# Patient Record
(demographics unavailable — no encounter records)

---

## 2018-08-15 NOTE — RAD
LEFT HIP TWO VIEWS:

8/15/18

 

No fracture was appreciated. There is no dislocation or joint space narrowing. The adjacent pubic rin
g appeared intact. Very subtle fractures might be missed on this study and would only be seen by CT. 


 

IMPRESSION:  

No definite acute findings. If pain persists, then consider followup CT or MRI. 

 

POS: HOME

## 2018-08-17 NOTE — CT
CT LUMBAR SPINE NONCONTRAST:

8/17/18

 

HISTORY: 

Fall. Back pain.

 

FINDINGS:  

compression of the L2 superior end plate with loss of height by approximately 30% appears. Stable com
pared to the CT abdomen from 2/28/18.

Very mild retropulsion. No acute fracture or dislocation. Prominent degenerative changes including ce
ntral canal and foraminal stenoses. 

 

Images including the retroperitoneum show calcifications associated with each kidney and parapelvic c
ysts of the left kidney.

 

IMPRESSION:  

Partial compression L2 superior end plate appears stable. No acute osseous abnormalities are demonstr
ated. 

 

Prominent degenerative changes lumbar spine with central canal and foraminal stenosis. 

 

POS: [facility]

## 2018-08-17 NOTE — RAD
AP PELVIS ONE VIEW:

8/17/18

 

HISTORY: 

Fall. Pelvic injury. 

 

FINDINGS/IMPRESSION:  

Sacral alae and pelvic rings are intact. Osseous structures are demineralized. 

 

No displaced fractures are evident.

 

POS: [facility]

## 2018-08-18 NOTE — PDOC.PN
- Subjective


Encounter Start Date: 08/18/18


Encounter Start Time: 08:00


-: old records requested/rev





Patient seen and examined. No new complaints. No overnight events





- Objective


Resuscitation Status: 


 











Resuscitation Status           FULL:Full Resuscitation














MAR Reviewed: Yes


Vital Signs & Weight: 


 Vital Signs (12 hours)











  Temp Pulse Resp BP Pulse Ox


 


 08/18/18 07:45  97.9 F  88  16  124/77  94 L


 


 08/18/18 04:11  97.9 F  85  16  101/66  95


 


 08/18/18 01:00  98.3 F  91  20  133/85  98








 Weight











Weight                         119 lb 0.794 oz














I&O: 


 











 08/17/18 08/18/18 08/19/18





 06:59 06:59 06:59


 


Intake Total  320 


 


Balance  320 











Result Diagrams: 


 08/18/18 04:44





 08/18/18 04:44


Radiology Reviewed by me: Yes





Phys Exam





- Physical Examination


Constitutional: NAD


HEENT: PERRLA, moist MMs, sclera anicteric, oral pharynx no lesions


Neck: no JVD, supple


Respiratory: no wheezing, no rales, no rhonchi


Cardiovascular: RRR, no significant murmur, no rub


Gastrointestinal: soft, non-tender, no distention, positive bowel sounds


Musculoskeletal: no edema, pulses present


Neurological: non-focal, normal sensation


Lymphatic: no nodes


Psychiatric: normal affect, A&O x 3


Skin: no rash, normal turgor





Dx/Plan


(1) Compression fracture of L2 lumbar vertebra


Code(s): S32.020A - WEDGE COMPRESSION FRACTURE OF SECOND LUMBAR VERTEBRA, INIT 

  Status: Acute   





(2) Intractable back pain


Code(s): M54.9 - DORSALGIA, UNSPECIFIED   Status: Acute   





- Plan


cont current plan of care, plan discussed w/ family, PT/OT, 





* pain control


* may need brace


* may need rehab


* neurosurgery consulted.








Review of Systems





- Review of Systems


Eyes: negative: Pain, Vision Change, Conjunctivae Inflammation, Eyelid 

Inflammation, Redness, Other


ENT: negative: Ear Pain, Ear Discharge, Nose Pain, Nose Discharge, Nose 

Congestion, Mouth Pain, Mouth Swelling, Throat Pain, Throat Swelling, Other


Respiratory: negative: Cough, Dry, Shortness of Breath, Hemoptysis, SOB with 

Excertion, Pleuritic Pain, Sputum, Wheezing


Cardiovascular: negative: chest pain, palpitations, orthopnea, paroxysmal 

nocturnal dyspnea, edema, light headedness, other


Gastrointestinal: negative: Nausea, Vomiting, Abdominal Pain, Diarrhea, 

Constipation, Melena, Hematochezia, Other


Genitourinary: negative: Dysuria, Frequency, Incontinence, Hematuria, Retention

, Other


Musculoskeletal: Back Pain.  negative: Neck Pain, Shoulder Pain, Arm Pain, Hand 

Pain, Leg Pain, Foot Pain, Other





- Medications/Allergies


Allergies/Adverse Reactions: 


 Allergies











Allergy/AdvReac Type Severity Reaction Status Date / Time


 


No Known Drug Allergies Allergy   Verified 08/18/18 01:29











Medications: 


 Current Medications





Acetaminophen (Tylenol)  650 mg PO Q4H PRN


   PRN Reason: Headache/Fever or Pain


   Last Admin: 08/18/18 01:42 Dose:  650 mg


Hydrocodone Bitart/Acetaminophen (Norco 5/325)  2 tab PO Q4H PRN


   PRN Reason: Severe Pain (7-10)


Hydrocodone Bitart/Acetaminophen (Norco 5/325)  1 tab PO Q4H PRN


   PRN Reason: Moderate Pain (4-6)


Al Hydroxide/Mg Hydroxide (Maalox)  15 ml PO Q4H PRN


   PRN Reason: Heartburn  or Indigestion


Artificial Tears (Tears Naturale)  0 drop EA EYE PRN PRN


   PRN Reason: Dry Eyes


Cyclobenzaprine HCl (Flexeril)  10 mg PO Q8H PRN


   PRN Reason: Muscle Spasm


   Last Admin: 08/18/18 02:24 Dose:  10 mg


Famotidine (Pepcid)  20 mg PO BIDPRN PRN


   PRN Reason: Heartburn  or Indigestion


Guaifenesin (Robitussin Sf)  200 mg PO Q4H PRN


   PRN Reason: Cough


Hydralazine HCl (Apresoline)  10 mg SLOW IVP Q4H PRN


   PRN Reason: Systolic BP > 180


Ketorolac Tromethamine (Toradol)  15 mg IVP Q6H PRN


   PRN Reason: Pain


   Stop: 08/23/18 09:05


Loperamide HCl (Imodium)  2 mg PO PRN PRN


   PRN Reason: Diarrhea/Loose Stools


Loratadine (Claritin)  10 mg PO DAILYPRN PRN


   PRN Reason: Sinus Symptoms


Magnesium Hydroxide (Milk Of Magnesium)  30 ml PO DAILYPRN PRN


   PRN Reason: Constipation


Mineral Oil/White Petrolatum (Eucerin Cream)  0 gm TOP BIDPRN PRN


   PRN Reason: Dry Skin


Morphine Sulfate (Morphine)  4 mg SLOW IVP Q4H PRN


   PRN Reason: Pain


Ondansetron HCl (Zofran)  4 mg IVP Q6H PRN


   PRN Reason: Nausea/Vomiting


Ondansetron HCl (Zofran Odt)  4 mg PO Q6H PRN


   PRN Reason: Nausea/Vomiting


Phenol (Chloraseptic Spray 180 Ml Bot)  0 ml PO PRN PRN


   PRN Reason: Sore Throat


Senna (Senokot)  2 tab PO HSPRN PRN


   PRN Reason: Constipation


Sodium Chloride (Ocean Nasal Spray 0.65%)  0 ml EA NARE QIDPRN PRN


   PRN Reason: Nasal Congestion


Temazepam (Restoril)  15 mg PO HSPRN PRN


   PRN Reason: Insomnia


Tramadol HCl (Ultram)  50 mg PO Q6H PRN


   PRN Reason: Moderate Pain (4-6)


   Last Admin: 08/18/18 01:42 Dose:  50 mg

## 2018-08-18 NOTE — CON
NEUROSURGICAL CONSULTATION

 

DATE OF CONSULTATION:  08/18/2018

 

CHIEF COMPLAINT:  Back pain.

 

HISTORY OF PRESENT ILLNESS:  Ms. Molina presented to the emergency room last night due to a fall la
st Wednesday.  The patient states that she fell while grocery shopping cart moved and she hit her rig
ht side when she went down.  Following the fall, she had severe back pain.  The patient went to the [facility] on Wednesday where x-rays were performed.  No fracture was noted, and they pre
scribed her tramadol and diclofenac.  The patient slept well that evening; however, the following day
 getting up and moving around, she was in excruciating pain.  Medications were not helping.  She repo
rted to our emergency room last night on Friday, 08/17/2018.  The patient states that her pain is in 
her low back just around the belt line.  She denies any radicular pain in her upper or lower extremit
ies.  She states that she has a little bit of numbness on the top of the right foot.  The patient sta
cynthia that it is uncomfortable for her to move; however, she is able to move all extremities.  The lisset
ent states that her pain is 10/10.

 

REVIEW OF SYSTEMS:  The patient denies any fever, chills, or feelings of fatigue.  She denies any yeyo
nges in vision or hearing.  Denies any dysphagia, sore throat, or stomach pain.  No nausea or vomitin
g.  No palpitations, shortness of breath, or chest pain.  The patient denies any abdominal pain, pate
ges in stool.  No urinary symptoms.  The patient denies any numbness or tingling.  No saddle anesthes
ia.  The patient reports back pain and hip pain on the right side.  No dizziness, confusion, or focal
 weakness.  No headache or paresthesia.

 

PAST MEDICAL HISTORY:  The patient denies any past medical history.

 

PAST SURGICAL HISTORY:  Cholecystectomy, hysterectomy, and kidney stone removal.

 

SOCIAL HISTORY:  The patient is  and lives with her  in their home.  They are fairly ac
tive.  She denies any history of smoking, drinking alcohol, or any other drug use.

 

ALLERGIES:  No known drug allergies.

 

MEDICATIONS:  The patient is currently taking tramadol and diclofenac prescribed from the [facility]
 on Wednesday.

 

PHYSICAL EXAMINATION:

VITAL SIGNS:  Temperature 97.9, pulse 88, respiratory rate 16, O2 sats 95% on room air, and blood pre
ssure 124/77.

GENERAL:  The patient is afebrile, not hypertensive.  She is alert and oriented to person, place, and
 time.  She is resting in her hospital bed with her feet up.

HEAD:  Normocephalic, atraumatic.

EYES/ENT:  Pupils are equal, round, and reactive to light.  Extraocular movements are intact.  Sclera
e nonicteric, not injected.  Moist mucous membranes.

RESPIRATORY:  Normal work of breathing room air.

CARDIOVASCULAR:  Normal S1 and S2.  Regular rate and rhythm.

NEUROLOGIC:  The patient is awake, alert, and oriented to person, place, and thing.  Cranial nerves I
I through XII are intact.  The patient has tenderness at the lumbar spine at L1.  Paraspinal muscles 
on either side are spasm and painful to the touch.  Upper extremity strength 5/5, lower extremity str
ength 5/5.  She is able to move all 4 extremities.  No dermatomal or sensory loss bilaterally.

 

IMAGING:  CT of the lumbar spine shows a compression fracture of L2.

 

ASSESSMENT AND PLAN:  The patient has sustained a compression fracture of the L2 lumbar vertebra.  We
 will use TLSO for support.  The patient should apply while lying down and wear if she is up and movi
ng.  She does not need to wear the brace when she is lying in bed or up and showering.  Pain control 
is important and the patient should be working with physical therapy to return her strength back to n
ormal, and so that she is able to return to normal functional living at home.  The patient should fol
low up in our office with imaging.

## 2018-08-19 NOTE — HP
PRIMARY CARE PHYSICIAN:  Dr. Emperatriz Valerio.

 

CODE STATUS:  FULL CODE.

 

TIME OF EVALUATION:  9:50 p.m.

 

CHIEF COMPLAINT:  Status post fall, severe back pain.

 

HISTORY OF PRESENT ILLNESS:  This is a 71-year-old female patient with a past medical history of frederic monroy.  The patient had a fall on Wednesday around 2:00 p.m. when she was pushing shopping cart to car.
  After that she had severe lower back pain, has not been improving, worsening with movement of her b
ack, only relieving factors is change to IV pain medications.

 

REVIEW OF SYSTEMS:

CONSTITUTIONAL:  No fever or chills, generalized weakness.

RESPIRATORY:  No cough, no sputum production or shortness of breath.

CARDIOVASCULAR:  No chest pain, palpitation, shortness of breath.

GASTROINTESTINAL:  No nausea, vomiting, diarrhea or abdominal pain.

CNS:  _____.

GENITOURINARY:  No burning with urination.

BACK:  Patient has a lower back pain as described in HPI.

All other systems were reviewed and negative except for the finding mentioned above.

 

PAST MEDICAL HISTORY:  As mentioned in the HPI.

 

SOCIAL HISTORY:  No alcohol, no drugs.  No smoking history.

 

PAST SURGICAL HISTORY:  Cholecystectomy, hysterectomy, and kidney stone.

 

PSYCHIATRIC HISTORY:  No psych history.  No malaise.

 

ALLERGIES:  No known drug allergies.

 

REPORTED MEDICATIONS:  Tramadol, diclofenac.

 

PHYSICAL EXAMINATION:

VITAL SIGNS:  On presentation, blood pressure 107/70, heart rate 97, respiratory rate was 18, tempera
ture 98.8, and pain was 10/10.

GENERAL APPEARANCE:  Patient is alert and oriented, in mild distress when moving due to pain.

HEENT:  Eyes:  Normal conjunctivae.  Moist oral mucosa.  Anicteric.

NECK:  No JVD.

RESPIRATORY:  Bilateral air entry.  No rales, no wheezing.  Symmetric expansion.

CARDIOVASCULAR:  Normal rate, regular rhythm.  No murmurs, no gallop, no edema.

ABDOMEN:  Soft, normal bowel sounds.

MUSCULOSKELETAL:  The patient has lower back, severe pain with movement, unable to bear weight due to
 pain.

SKIN:  Warm and intact.  No perineal rash.  No redness.  Peripheral pulses are present.  Capillary re
fill seems to be intact.

NEURO:  Baseline sensorium.  No evidence of any new focal weakness.  Baseline speech.  Cranial nerves
 sensory intact.

PSYCHIATRIC:  The patient is in a good mood.  No anxiety, oriented, optimal judgment.

 

CT of lumbar spine, partial compression of L2 superior endplate appears stable.  No acute osseous abn
ormalities are demonstrated, prominent degenerative changes of lumbar spine with central canal and fo
raminal stenosis.  Pelvis x-ray showed no displacement or fractures.

 

ASSESSMENT AND PLAN:  The patient was placed in the hospital for following medical problems.

1.  Severe intractable pain in the lower back due to compression fracture of L2, we will treat with o
pioids, _____ complication from treatment.

2.  L2 compression fracture secondary to fall on Wednesday, patient has continued with severe pain, I
 will place a consultation for Neurosurgery for evaluation of need for kyphoplasty.  We will follow r
ecommendations.  Patient would benefit from rehab and PT after neurosurgical evaluation; however, she
 has refused.

3.  Deep venous thrombosis prophylaxis.

## 2018-08-19 NOTE — DIS
DATE OF ADMISSION:  08/17/2018

 

DATE OF DISCHARGE:  08/19/2018

 

PRIMARY CARE PHYSICIAN:  Dr. Emperatriz Valerio.

 

DISCHARGE DISPOSITION:  Home with home health.

 

PRIMARY DISCHARGE DIAGNOSES:

1.  Acute partial compression of L2 superior endplate.

2.  Intractable low back pain.

 

SECONDARY DISCHARGE DIAGNOSIS:  None.

 

PRIMARY PROCEDURE/OPERATION:  None.

 

RADIOLOGICAL INVESTIGATION:  Lumbar spine CT scan showed partial compression, L2 superior endplate.  
Pelvis x-ray did not show any acute process.

 

SIGNIFICANT LABORATORY DATA:  WBC 5.4, hemoglobin 11.5, platelets 84.  Sodium 140, potassium 4.0, BUN
 15, creatinine 0.88, calcium 8.5.

 

DISCHARGE MEDICATIONS:  Tylenol #3 one or two tablets q.6 hourly p.r.n. for pain, Flexeril 10 mg p.o.
 t.i.d. p.r.n., tramadol 50 mg q.8 hourly p.r.n., diclofenac 75 mg p.o. b.i.d. p.r.n.

 

CONTRAINDICATIONS:  None.

 

CODE STATUS:  FULL CODE.

 

INPATIENT CONSULTANTS:  Dr. Toussaint was consulted while in hospital and he recommended brace therap
y, which was arranged while in hospital.

 

TEST RESULTS PENDING ON DISCHARGE:  None.

 

ALLERGIES:  No known drug allergy.

 

DISCHARGE PLAN:  Post hospital, the patient is advised to follow up with primary care physician for f
all and depression.  Primary care physician is requested to order a CBC and follow up on platelet cou
nt and give referral to Hematology if needed.

 

HOSPITAL COURSE:  This is a 71-year-old female with the above-mentioned medical problem, who was admi
tted by Dr. Zambrano.  Please see his H and P for further details.  The patient was admitted for acut
e intractable back pain.  Patient was found with a superior endplate L2 compression fracture.  Delmar mccall was admitted to medical floor.  She was evaluated by neurosurgeon and neurosurgeon recommended pain
 control and symptomatic treatment as well as brace therapy.  Patient is instructed to use brace when
ever she ambulates or off the bed.  The patient was also evaluated by physical therapy and occupation
al therapy while in hospital.  Patient was recommended to go to rehab if needed, but patient's husban
d is completely free at home and he refers that he can manage her at home as well.  For PT/OT, we arr
anged home health with help of .  Pain medication prescribed on discharge.  Walker is als
o prescribed.

 

The patient is seen and examined at bedside today.  Plan of care discussed with the patient and her h
usband at bedside.

 

PHYSICAL EXAMINATION:

VITAL SIGNS:  Currently, temperature 97.8, pulse 99, respiratory rate 18, saturation 91% on room air,
 blood pressure 132/85, weight 119 pounds.

GENERAL:  The patient is currently alert, awake, in no obvious acute distress.

HEAD:  Normocephalic, atraumatic.

EYES:  Pupils round, reactive to light.  Extraocular muscle intact.

ENT:  Oropharynx within normal limits.  Moist mucous membranes.  No oral lesion, no pharyngeal erythe
ma, no exudate.

NECK:  Supple, no JVD, no thyromegaly, no carotid bruit.

LUNGS:  Clear to auscultation.

CARDIAC:  S1 and S2 regular without any murmur.

ABDOMEN:  Soft and benign.

EXTREMITIES:  No edema.

NEUROLOGIC:  Nonfocal examination.

 

The patient will follow up with neurosurgeon as an outpatient basis for repeat imaging.  If the patie
nt changed her mind to go to rehab, then possibly rehab placement.  Paper work is already done.  Othe
rwise, home health will be also appropriate option.

## 2018-10-09 NOTE — CT
CT ABDOMEN AND PELVIS WITHOUT CONTRAST:

 

Date: 10-9-18

 

Spiral CT of the abdomen and pelvis was performed for evaluation of left flank pain. 

 

FINDINGS: 

There is severe left hydronephrosis secondary to a 5 mm distal left ureteral calculus that is near th
e UVJ. There appears to be one or two other small calculi in the ureter above it. 

 

Bilateral nonobstructing calculi are present. There is actually a calculus in the right renal pelvis 
but it is not currently obstructing. There are several other calcifications seen along the course of 
the right ureter, but they appear to be outside of it. There is no clear obstruction on the right. 

 

The lung bases are clear. The liver and spleen were unremarkable for a noncontrast study. There is di
ffuse fatty infiltration of the liver. There is perhaps a little stranding around the liver but I bel
ieve that this is not acute, nor does it fit with the patient's symptoms. The aorta is unremarkable i
n appearance. No adrenal mass is seen. 

 

The bowel shows no distention, inflammatory change, or bowel wall thickening. No free air or free flu
id was seen. 

 

CT of the pelvis shows no pelvic masses, inflammatory changes, or fluid collections. A bulging disc i
s seen at L5-S1 and there is a moderate degree of spinal stenosis at L4-5. 

 

IMPRESSION: 

1. 5 mm distal left ureteral calculus causing severe left hydronephrosis. There is one or two smaller
 calculi in the left ureter above it. 

2. Bilateral nonobstructing calculi in the kidneys. 

3. Several calcifications along the course of the right ureter, but probably not within it. 

4. Other findings as listed above. 

 

Report called to Dr. Trujillo at 1051 on 10-9-18.

 

 

 

POS: HOME

## 2018-10-09 NOTE — RAD
PORTABLE CHEST

10/9/18

 

Comparison is made with an 11/24/16 study.

 

The heart is normal in size. The mediastinum shows no widening or shift. The lungs are clear with no 
lobar infiltrate or effusion seen. There is no vascular congestion. 

 

IMPRESSION:  

No acute thoracic finding. 

 

POS: HOME

## 2018-10-27 NOTE — RAD
PORTABLE CHEST:

 

Date  10/27/18 

 

An AP portable film at 1232 hours is compared with the 10/14/18 study from [facility]. 

 

FINDINGS:

The diffuse pulmonary opacity seen previously has largely resolved. There is a linear vertical infilt
rate in the right lower chest extending down to the right costophrenic angle that I presume to be res
idual. No lobar consolidation was appreciated. No large effusions were seen. The heart is mildly enla
rged, but there are no congestive findings. 

 

IMPRESSION: 

Overall improvement since 10/14/18. Linear right basilar infiltrate which may be atelectasis or resid
ual. 

 

 

POS: HOME